# Patient Record
Sex: FEMALE | Race: WHITE
[De-identification: names, ages, dates, MRNs, and addresses within clinical notes are randomized per-mention and may not be internally consistent; named-entity substitution may affect disease eponyms.]

---

## 2019-08-04 ENCOUNTER — HOSPITAL ENCOUNTER (EMERGENCY)
Dept: HOSPITAL 92 - ERS | Age: 44
Discharge: HOME | End: 2019-08-04
Payer: COMMERCIAL

## 2019-08-04 DIAGNOSIS — F17.210: ICD-10-CM

## 2019-08-04 DIAGNOSIS — F32.9: ICD-10-CM

## 2019-08-04 DIAGNOSIS — N39.0: ICD-10-CM

## 2019-08-04 DIAGNOSIS — N83.201: Primary | ICD-10-CM

## 2019-08-04 LAB
ALBUMIN SERPL BCG-MCNC: 4 G/DL (ref 3.5–5)
ALP SERPL-CCNC: 77 U/L (ref 40–150)
ALT SERPL W P-5'-P-CCNC: 8 U/L (ref 8–55)
ANION GAP SERPL CALC-SCNC: 11 MMOL/L (ref 10–20)
AST SERPL-CCNC: 11 U/L (ref 5–34)
BACTERIA UR QL AUTO: (no result) HPF
BASOPHILS # BLD AUTO: 0.1 THOU/UL (ref 0–0.2)
BASOPHILS NFR BLD AUTO: 0.8 % (ref 0–1)
BILIRUB SERPL-MCNC: 0.2 MG/DL (ref 0.2–1.2)
BUN SERPL-MCNC: 13 MG/DL (ref 7–18.7)
CALCIUM SERPL-MCNC: 9 MG/DL (ref 7.8–10.44)
CHLORIDE SERPL-SCNC: 108 MMOL/L (ref 98–107)
CO2 SERPL-SCNC: 23 MMOL/L (ref 22–29)
CREAT CL PREDICTED SERPL C-G-VRATE: 0 ML/MIN (ref 70–130)
EOSINOPHIL # BLD AUTO: 0.5 THOU/UL (ref 0–0.7)
EOSINOPHIL NFR BLD AUTO: 5.7 % (ref 0–10)
GLOBULIN SER CALC-MCNC: 2.8 G/DL (ref 2.4–3.5)
GLUCOSE SERPL-MCNC: 109 MG/DL (ref 70–105)
HGB BLD-MCNC: 12.9 G/DL (ref 12–16)
LEUKOCYTE ESTERASE UR QL STRIP.AUTO: 25 LEU/UL
LIPASE SERPL-CCNC: 31 U/L (ref 8–78)
LYMPHOCYTES # BLD: 2 THOU/UL (ref 1.2–3.4)
LYMPHOCYTES NFR BLD AUTO: 22.7 % (ref 21–51)
MCH RBC QN AUTO: 31.4 PG (ref 27–31)
MCV RBC AUTO: 91.1 FL (ref 78–98)
MONOCYTES # BLD AUTO: 0.5 THOU/UL (ref 0.11–0.59)
MONOCYTES NFR BLD AUTO: 5.9 % (ref 0–10)
MUCOUS THREADS UR QL AUTO: (no result) LPF
NEUTROPHILS # BLD AUTO: 5.7 THOU/UL (ref 1.4–6.5)
NEUTROPHILS NFR BLD AUTO: 64.9 % (ref 42–75)
PLATELET # BLD AUTO: 274 THOU/UL (ref 130–400)
POTASSIUM SERPL-SCNC: 4.1 MMOL/L (ref 3.5–5.1)
RBC # BLD AUTO: 4.11 MILL/UL (ref 4.2–5.4)
RBC UR QL AUTO: (no result) HPF (ref 0–3)
SODIUM SERPL-SCNC: 138 MMOL/L (ref 136–145)
WBC # BLD AUTO: 8.8 THOU/UL (ref 4.8–10.8)

## 2019-08-04 PROCEDURE — 81003 URINALYSIS AUTO W/O SCOPE: CPT

## 2019-08-04 PROCEDURE — 80053 COMPREHEN METABOLIC PANEL: CPT

## 2019-08-04 PROCEDURE — 96361 HYDRATE IV INFUSION ADD-ON: CPT

## 2019-08-04 PROCEDURE — 96374 THER/PROPH/DIAG INJ IV PUSH: CPT

## 2019-08-04 PROCEDURE — 36415 COLL VENOUS BLD VENIPUNCTURE: CPT

## 2019-08-04 PROCEDURE — 93976 VASCULAR STUDY: CPT

## 2019-08-04 PROCEDURE — 74176 CT ABD & PELVIS W/O CONTRAST: CPT

## 2019-08-04 PROCEDURE — 85025 COMPLETE CBC W/AUTO DIFF WBC: CPT

## 2019-08-04 PROCEDURE — 81015 MICROSCOPIC EXAM OF URINE: CPT

## 2019-08-04 PROCEDURE — 83690 ASSAY OF LIPASE: CPT

## 2019-08-04 PROCEDURE — 76856 US EXAM PELVIC COMPLETE: CPT

## 2019-08-04 PROCEDURE — 96375 TX/PRO/DX INJ NEW DRUG ADDON: CPT

## 2019-08-04 PROCEDURE — 87086 URINE CULTURE/COLONY COUNT: CPT

## 2019-08-04 NOTE — CT
EXAM:

CT Stone  Protocol



PROVIDED CLINICAL HISTORY:

Right flank pain



COMPARISON:

None



FINDINGS:

Visualized lung bases are free of significant opacity.



Bilateral perinephric fat stranding. No evidence for urinary tract calculi or hydronephrosis. 1.7 cm 
incompletely characterized mass arising from the posterior aspect of the left kidney.



No bowel dilatation, additional inflammatory fat stranding or lymph node enlargement apparent. Trace 
free pelvic fluid.



4.4 cm right adnexal mass, incompletely characterized. Appendix appears normal.



The osseous structures demonstrate no concerning lytic or blastic lesions.



IMPRESSION:



1. Bilateral perinephric fat stranding without evidence for urinary tract calculi or hydronephrosis. 
Correlate with concerns for pyelonephritis.

2. Incompletely characterized 4.4 cm right adnexal mass. Correlation with pelvic ultrasound recommend
ed.

3. Incompletely characterized 1.7 cm left renal mass. Correlation with nonemergent renal ultrasound r
ecommended.



Reported By: Efren Christensen 

Electronically Signed:  8/4/2019 8:24 AM

## 2019-08-04 NOTE — ULT
EXAM:

US Pelvic W Doppler



PROVIDED CLINICAL HISTORY:

Right flank pain



COMPARISON:

Concurrently performed CT examination



FINDINGS:

The uterus is not visualized compatible with the provided clinical history of prior hysterectomy. The
 left ovary is not demonstrated.



The right ovary measures about 4.7 x 4.3 x 4.2 cm and demonstrates a complex cystic mass measuring ab
out 2.9 cm. This may reflect physiologic change. Color Doppler and spectral analysis of the ovarian

waveforms demonstrates normal flow.



No evidence for significant free pelvic fluid.



IMPRESSION:

No evidence for ovarian torsion. Follow-up 12 week pelvic ultrasound recommended for evaluation of re
solution of cystic mass that may be physiologic.



Reported By: Efren Christensen 

Electronically Signed:  8/4/2019 9:41 AM

## 2021-12-22 ENCOUNTER — HOSPITAL ENCOUNTER (OUTPATIENT)
Dept: HOSPITAL 92 - CSHMAMMO | Age: 46
Discharge: HOME | End: 2021-12-22
Attending: NURSE PRACTITIONER
Payer: COMMERCIAL

## 2021-12-22 DIAGNOSIS — Z12.31: Primary | ICD-10-CM

## 2021-12-22 PROCEDURE — 77067 SCR MAMMO BI INCL CAD: CPT

## 2021-12-22 PROCEDURE — 77063 BREAST TOMOSYNTHESIS BI: CPT

## 2023-01-10 ENCOUNTER — HOSPITAL ENCOUNTER (OUTPATIENT)
Dept: HOSPITAL 92 - CSHMAMMO | Age: 48
Discharge: HOME | End: 2023-01-10
Payer: COMMERCIAL

## 2023-01-10 DIAGNOSIS — Z12.31: Primary | ICD-10-CM

## 2023-01-10 PROCEDURE — 77067 SCR MAMMO BI INCL CAD: CPT

## 2023-01-10 PROCEDURE — 77063 BREAST TOMOSYNTHESIS BI: CPT
